# Patient Record
Sex: MALE | Race: WHITE | NOT HISPANIC OR LATINO | Employment: OTHER | URBAN - METROPOLITAN AREA
[De-identification: names, ages, dates, MRNs, and addresses within clinical notes are randomized per-mention and may not be internally consistent; named-entity substitution may affect disease eponyms.]

---

## 2022-11-22 ENCOUNTER — HOSPITAL ENCOUNTER (OUTPATIENT)
Dept: GENERAL RADIOLOGY | Facility: HOSPITAL | Age: 67
Discharge: HOME | End: 2022-11-22
Admitting: RADIOLOGY

## 2022-11-22 DIAGNOSIS — H44.609: ICD-10-CM

## 2022-11-22 PROCEDURE — 70200 X-RAY EXAM OF EYE SOCKETS: CPT

## 2024-08-08 ENCOUNTER — TELEPHONE (OUTPATIENT)
Dept: NEUROLOGY | Facility: CLINIC | Age: 69
End: 2024-08-08

## 2024-08-08 ENCOUNTER — OFFICE VISIT (OUTPATIENT)
Dept: NEUROLOGY | Facility: CLINIC | Age: 69
End: 2024-08-08
Payer: COMMERCIAL

## 2024-08-08 ENCOUNTER — LAB (OUTPATIENT)
Dept: LAB | Facility: HOSPITAL | Age: 69
End: 2024-08-08
Payer: COMMERCIAL

## 2024-08-08 VITALS
SYSTOLIC BLOOD PRESSURE: 148 MMHG | BODY MASS INDEX: 35.68 KG/M2 | HEART RATE: 74 BPM | HEIGHT: 74 IN | WEIGHT: 278 LBS | DIASTOLIC BLOOD PRESSURE: 84 MMHG | OXYGEN SATURATION: 98 %

## 2024-08-08 DIAGNOSIS — G62.9 PERIPHERAL POLYNEUROPATHY: ICD-10-CM

## 2024-08-08 DIAGNOSIS — G62.9 PERIPHERAL POLYNEUROPATHY: Primary | ICD-10-CM

## 2024-08-08 DIAGNOSIS — R25.1 TREMOR: ICD-10-CM

## 2024-08-08 DIAGNOSIS — R41.3 MEMORY LOSS: ICD-10-CM

## 2024-08-08 DIAGNOSIS — R79.9 ABNORMAL FINDING OF BLOOD CHEMISTRY, UNSPECIFIED: ICD-10-CM

## 2024-08-08 DIAGNOSIS — R73.03 PREDIABETES: ICD-10-CM

## 2024-08-08 DIAGNOSIS — R76.8 ELEVATED SERUM IMMUNOGLOBULIN FREE LIGHT CHAINS: ICD-10-CM

## 2024-08-08 PROBLEM — I25.10 CALCIFICATION OF CORONARY ARTERY: Status: ACTIVE | Noted: 2018-10-17

## 2024-08-08 PROBLEM — I25.84 CALCIFICATION OF CORONARY ARTERY: Status: ACTIVE | Noted: 2018-10-17

## 2024-08-08 PROBLEM — G47.33 OBSTRUCTIVE SLEEP APNEA SYNDROME: Status: ACTIVE | Noted: 2021-08-09

## 2024-08-08 PROBLEM — K74.60 CIRRHOSIS: Status: ACTIVE | Noted: 2021-07-27

## 2024-08-08 PROBLEM — I10 HYPERTENSIVE DISORDER: Status: ACTIVE | Noted: 2018-03-09

## 2024-08-08 PROBLEM — E78.5 HYPERLIPIDEMIA: Status: ACTIVE | Noted: 2021-08-09

## 2024-08-08 PROBLEM — M54.16 LUMBAR RADICULOPATHY: Status: ACTIVE | Noted: 2022-11-14

## 2024-08-08 PROBLEM — E11.9 DIABETES MELLITUS: Status: ACTIVE | Noted: 2021-08-09

## 2024-08-08 LAB
FERRITIN SERPL-MCNC: 348.3 NG/ML (ref 30–400)
TSH SERPL DL<=0.05 MIU/L-ACNC: 1.07 UIU/ML (ref 0.27–4.2)
VIT B12 BLD-MCNC: 495 PG/ML (ref 211–946)

## 2024-08-08 PROCEDURE — 82607 VITAMIN B-12: CPT | Performed by: NURSE PRACTITIONER

## 2024-08-08 PROCEDURE — 86235 NUCLEAR ANTIGEN ANTIBODY: CPT | Performed by: NURSE PRACTITIONER

## 2024-08-08 PROCEDURE — 82525 ASSAY OF COPPER: CPT | Performed by: NURSE PRACTITIONER

## 2024-08-08 PROCEDURE — 83655 ASSAY OF LEAD: CPT | Performed by: NURSE PRACTITIONER

## 2024-08-08 PROCEDURE — 83521 IG LIGHT CHAINS FREE EACH: CPT | Performed by: NURSE PRACTITIONER

## 2024-08-08 PROCEDURE — 86258 DGP ANTIBODY EACH IG CLASS: CPT | Performed by: NURSE PRACTITIONER

## 2024-08-08 PROCEDURE — 84425 ASSAY OF VITAMIN B-1: CPT | Performed by: NURSE PRACTITIONER

## 2024-08-08 PROCEDURE — 84165 PROTEIN E-PHORESIS SERUM: CPT | Performed by: NURSE PRACTITIONER

## 2024-08-08 PROCEDURE — 84446 ASSAY OF VITAMIN E: CPT | Performed by: NURSE PRACTITIONER

## 2024-08-08 PROCEDURE — 36415 COLL VENOUS BLD VENIPUNCTURE: CPT | Performed by: NURSE PRACTITIONER

## 2024-08-08 PROCEDURE — 82175 ASSAY OF ARSENIC: CPT | Performed by: NURSE PRACTITIONER

## 2024-08-08 PROCEDURE — 84443 ASSAY THYROID STIM HORMONE: CPT | Performed by: NURSE PRACTITIONER

## 2024-08-08 PROCEDURE — 84155 ASSAY OF PROTEIN SERUM: CPT | Performed by: NURSE PRACTITIONER

## 2024-08-08 PROCEDURE — 84207 ASSAY OF VITAMIN B-6: CPT | Performed by: NURSE PRACTITIONER

## 2024-08-08 PROCEDURE — 86364 TISS TRNSGLTMNASE EA IG CLAS: CPT | Performed by: NURSE PRACTITIONER

## 2024-08-08 PROCEDURE — 86038 ANTINUCLEAR ANTIBODIES: CPT

## 2024-08-08 PROCEDURE — 82728 ASSAY OF FERRITIN: CPT | Performed by: NURSE PRACTITIONER

## 2024-08-08 PROCEDURE — 83825 ASSAY OF MERCURY: CPT | Performed by: NURSE PRACTITIONER

## 2024-08-08 PROCEDURE — 82784 ASSAY IGA/IGD/IGG/IGM EACH: CPT | Performed by: NURSE PRACTITIONER

## 2024-08-08 RX ORDER — ASPIRIN 81 MG/1
81 TABLET ORAL DAILY
COMMUNITY

## 2024-08-08 RX ORDER — LISINOPRIL 40 MG/1
1 TABLET ORAL DAILY
COMMUNITY
Start: 2024-06-27

## 2024-08-08 NOTE — TELEPHONE ENCOUNTER
----- Message from Flor Gamboa sent at 8/8/2024  3:13 PM EDT -----  Low B12 level. Recommend once weekly B12 injections x 4 weeks followed by biweekly injections x 4 months with repeat lab draw 1 month from last injection. Can we see if they want to do these at home or in office?

## 2024-08-08 NOTE — TELEPHONE ENCOUNTER
Called spoke with wife- she is going to talk to patient and call us back on how they would like to proceed
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
## 2024-08-09 LAB
ALBUMIN SERPL ELPH-MCNC: 3.3 G/DL (ref 2.9–4.4)
ALBUMIN/GLOB SERPL: 0.9 {RATIO} (ref 0.7–1.7)
ALPHA1 GLOB SERPL ELPH-MCNC: 0.3 G/DL (ref 0–0.4)
ALPHA2 GLOB SERPL ELPH-MCNC: 0.8 G/DL (ref 0.4–1)
B-GLOBULIN SERPL ELPH-MCNC: 1.2 G/DL (ref 0.7–1.3)
ENA SS-A AB SER-ACNC: <0.2 AI (ref 0–0.9)
ENA SS-B AB SER-ACNC: <0.2 AI (ref 0–0.9)
GAMMA GLOB SERPL ELPH-MCNC: 1.4 G/DL (ref 0.4–1.8)
GLIADIN PEPTIDE IGA SER-ACNC: 9 UNITS (ref 0–19)
GLOBULIN SER CALC-MCNC: 3.7 G/DL (ref 2.2–3.9)
IGA SERPL-MCNC: 625 MG/DL (ref 61–437)
KAPPA LC FREE SER-MCNC: 31.7 MG/L (ref 3.3–19.4)
KAPPA LC FREE/LAMBDA FREE SER: 1.18 {RATIO} (ref 0.26–1.65)
LABORATORY COMMENT REPORT: NORMAL
LAMBDA LC FREE SERPL-MCNC: 26.9 MG/L (ref 5.7–26.3)
M PROTEIN SERPL ELPH-MCNC: NORMAL G/DL
PROT PATTERN SERPL ELPH-IMP: NORMAL
PROT SERPL-MCNC: 7 G/DL (ref 6–8.5)
TTG IGA SER-ACNC: <2 U/ML (ref 0–3)

## 2024-08-10 LAB — COPPER SERPL-MCNC: 96 UG/DL (ref 69–132)

## 2024-08-11 LAB
ANA SER QL IF: NEGATIVE
LABORATORY COMMENT REPORT: NORMAL
PYRIDOXAL PHOS SERPL-MCNC: 14.1 UG/L (ref 3.4–65.2)

## 2024-08-12 LAB — VIT B1 BLD-SCNC: 120.4 NMOL/L (ref 66.5–200)

## 2024-08-12 NOTE — PROGRESS NOTES
Placed referral for hematology to follow up on abnormal light chains. Attempted to contact patient via telephone on 8/12/2024 at 12:48 PM with no answer and no voicemail.

## 2024-08-13 LAB
A-TOCOPHEROL VIT E SERPL-MCNC: 7.1 MG/L (ref 9–29)
ARSENIC BLD-MCNC: 5 UG/L (ref 0–9)
GAMMA TOCOPHEROL SERPL-MCNC: 4.4 MG/L (ref 0.5–4.9)
LEAD BLDV-MCNC: 3 UG/DL (ref 0–3.4)
MERCURY BLD-MCNC: <1 UG/L (ref 0–14.9)

## 2024-08-26 ENCOUNTER — TELEPHONE (OUTPATIENT)
Dept: NEUROLOGY | Facility: CLINIC | Age: 69
End: 2024-08-26
Payer: MEDICARE

## 2024-08-26 NOTE — TELEPHONE ENCOUNTER
Message from hematology, Dr Bond reviewed- Please repeat serum immunofixation and if that is unremarkable, he likely does not need to be seen by our practice.  However, if it shows a monoclonal protein on the serum immunofixation, perhaps we could try to get him in within a couple of months.

## 2024-08-28 DIAGNOSIS — G62.9 PERIPHERAL POLYNEUROPATHY: Primary | ICD-10-CM

## 2024-08-28 NOTE — TELEPHONE ENCOUNTER
Can we call patient and let him know that we need him to come for repeat lab draw for the blood markers that were abnormal. Hematology would like a recheck before deciding to see him

## 2024-09-16 ENCOUNTER — HOSPITAL ENCOUNTER (OUTPATIENT)
Dept: MRI IMAGING | Facility: HOSPITAL | Age: 69
Discharge: HOME | End: 2024-09-16
Admitting: NURSE PRACTITIONER
Payer: MEDICARE

## 2024-09-16 DIAGNOSIS — R41.3 MEMORY LOSS: ICD-10-CM

## 2024-09-16 DIAGNOSIS — R25.1 TREMOR: ICD-10-CM

## 2024-09-16 PROCEDURE — 0 GADOBENATE DIMEGLUMINE 529 MG/ML SOLUTION: Performed by: NURSE PRACTITIONER

## 2024-09-16 PROCEDURE — 70553 MRI BRAIN STEM W/O & W/DYE: CPT

## 2024-09-16 PROCEDURE — A9577 INJ MULTIHANCE: HCPCS | Performed by: NURSE PRACTITIONER

## 2024-09-16 RX ADMIN — GADOBENATE DIMEGLUMINE 20 ML: 529 INJECTION, SOLUTION INTRAVENOUS at 12:12

## 2024-09-20 ENCOUNTER — OFFICE VISIT (OUTPATIENT)
Dept: NEUROLOGY | Facility: CLINIC | Age: 69
End: 2024-09-20
Payer: MEDICARE

## 2024-09-20 VITALS
HEART RATE: 97 BPM | SYSTOLIC BLOOD PRESSURE: 148 MMHG | HEIGHT: 74 IN | WEIGHT: 277 LBS | BODY MASS INDEX: 35.55 KG/M2 | DIASTOLIC BLOOD PRESSURE: 88 MMHG

## 2024-09-20 DIAGNOSIS — G62.9 PERIPHERAL POLYNEUROPATHY: Primary | ICD-10-CM

## 2024-09-20 DIAGNOSIS — R25.1 TREMOR: ICD-10-CM

## 2024-09-20 DIAGNOSIS — R41.3 MEMORY LOSS: ICD-10-CM

## 2024-09-20 RX ORDER — ATORVASTATIN CALCIUM 20 MG/1
1 TABLET, FILM COATED ORAL DAILY
COMMUNITY
Start: 2024-09-15

## 2024-09-30 ENCOUNTER — OFFICE VISIT (OUTPATIENT)
Dept: NEUROLOGY | Facility: CLINIC | Age: 69
End: 2024-09-30
Payer: MEDICARE

## 2024-09-30 ENCOUNTER — LAB (OUTPATIENT)
Dept: LAB | Facility: HOSPITAL | Age: 69
End: 2024-09-30
Payer: MEDICARE

## 2024-09-30 VITALS
DIASTOLIC BLOOD PRESSURE: 68 MMHG | SYSTOLIC BLOOD PRESSURE: 142 MMHG | WEIGHT: 280 LBS | HEART RATE: 93 BPM | BODY MASS INDEX: 35.94 KG/M2 | OXYGEN SATURATION: 98 % | HEIGHT: 74 IN

## 2024-09-30 DIAGNOSIS — G62.9 PERIPHERAL POLYNEUROPATHY: ICD-10-CM

## 2024-09-30 DIAGNOSIS — R76.8 ELEVATED SERUM IMMUNOGLOBULIN FREE LIGHT CHAINS: ICD-10-CM

## 2024-09-30 DIAGNOSIS — R26.9 GAIT DISTURBANCE: ICD-10-CM

## 2024-09-30 DIAGNOSIS — R25.1 TREMOR: ICD-10-CM

## 2024-09-30 DIAGNOSIS — R79.9 ABNORMAL FINDING OF BLOOD CHEMISTRY, UNSPECIFIED: ICD-10-CM

## 2024-09-30 DIAGNOSIS — R41.3 MEMORY LOSS: Primary | ICD-10-CM

## 2024-09-30 PROCEDURE — 3078F DIAST BP <80 MM HG: CPT | Performed by: NURSE PRACTITIONER

## 2024-09-30 PROCEDURE — 82784 ASSAY IGA/IGD/IGG/IGM EACH: CPT | Performed by: NURSE PRACTITIONER

## 2024-09-30 PROCEDURE — 83521 IG LIGHT CHAINS FREE EACH: CPT | Performed by: NURSE PRACTITIONER

## 2024-09-30 PROCEDURE — 3077F SYST BP >= 140 MM HG: CPT | Performed by: NURSE PRACTITIONER

## 2024-09-30 PROCEDURE — 99214 OFFICE O/P EST MOD 30 MIN: CPT | Performed by: NURSE PRACTITIONER

## 2024-09-30 PROCEDURE — 86334 IMMUNOFIX E-PHORESIS SERUM: CPT | Performed by: NURSE PRACTITIONER

## 2024-09-30 RX ORDER — CARBIDOPA/LEVODOPA 25MG-250MG
1 TABLET ORAL DAILY
Qty: 30 TABLET | Refills: 2 | Status: SHIPPED | OUTPATIENT
Start: 2024-09-30 | End: 2025-09-30

## 2024-09-30 NOTE — PROGRESS NOTES
[facility] NEUROLOGY         Date of Visit: 2024    Name: Zion Swann    :  1955    PCP: Broderick Acosta MD    Visit Type: an initial evaluation         Subjective     Patient ID: Zion is a 69 y.o. male.         History of Present Illness  I have had the pleasure of seeing your patient today. As you may know he is a 69 year old male here today for follow-up for tremor, memory loss, and gait changes. He was referred by his PCP. He is accompanied by his wife for appointment today.     History:    Patient does have history of RONNI (not currently on CPAP, waiting on new machine), hypertension, hyperlipidemia, possible prediabeties, lumbar radiculopathy, cirrohsis of the liver, previous artery bypass for femoral aneurysm.     Patient does have family history of a parent and sibling with parkinson's diease. He has never had any workup in past by neurology. He does report one concussion in the past.     See office visit from 2024 for more detailed historical information on presentation of symptoms.    Patient did have MRI of the brain done in July which revealed evidence for lacunar infarcts related to hypertension as well as a small right thalamic microhemorrhage.  He is taking vitamin D D as well as vitamin B12.  He also had some abnormalities noted of his light chains which we are repeating today.  We also had him start his carbidopa taking it 3 times daily as he is not taking this consistently.  He is here today for evaluation.    Current:    Patient states that he is doing fairly well on the carbidopa.  He has not noticed any side effect issues.  They have noticed a decrease in his tremor symptoms with a mild improvement in overall fluidity of movement since last visit.  They state he still has some tremor and a lot of stiffness in decreased mobility issues.  They are hoping to get a wheelchair as long distance ambulation is extremely difficult for him.  He does fine with short  distance day-to-day ambulation but having to walk more than 100 feet or so becomes very difficult due to the neuropathy and back issues.  This limits him drastically and his ability to do things outside of the home.  They deny any stomach issues or constipation with the medication.  No hallucinations or vivid dreams.  No lightheadedness or dizziness.  He also continues to have issues with short-term memory.  No other new neurological complaints at today's visit.              The following portions of the patient's history were reviewed and updated as appropriate: allergies, current medications, past family history, past medical history, past social history, past surgical history, and problem list.                 Review of Systems   Constitutional:  Negative for activity change, appetite change and unexpected weight change.   HENT:  Negative for hearing loss, tinnitus and trouble swallowing.    Eyes:  Negative for photophobia, pain and visual disturbance.   Respiratory:  Negative for chest tightness and shortness of breath.    Cardiovascular:  Negative for palpitations.   Musculoskeletal:  Positive for back pain and gait problem.   Neurological:  Positive for dizziness and tremors. Negative for syncope, facial asymmetry, speech difficulty and light-headedness.   Psychiatric/Behavioral:  Positive for sleep disturbance. Negative for behavioral problems, confusion, dysphoric mood and hallucinations. The patient is not hyperactive.             Current Medications:    Current Outpatient Medications   Medication Instructions    aspirin 81 mg, Oral, Daily    atorvastatin (LIPITOR) 20 MG tablet 1 tablet, Oral, Daily    carbidopa-levodopa (Sinemet)  MG per tablet 1 tablet, Oral, Daily    lisinopril (PRINIVIL,ZESTRIL) 40 MG tablet 1 tablet, Oral, Daily    metFORMIN (GLUCOPHAGE) 500 MG tablet TAKE 1 TABLET BY MOUTH TWICE A DAY AFTER MEALS    vitamin E 400 Units, Oral, Daily          /68   Pulse 93   Ht 188 cm  "(74.02\")   Wt 127 kg (280 lb)   SpO2 98%   BMI 35.93 kg/m²                Objective     Neurological Exam  Mental Status  Awake, alert and oriented to person, place and time. Speech is normal. Language is fluent with no aphasia.    Cranial Nerves  CN II: Visual fields full to confrontation.  CN III, IV, VI: Extraocular movements intact bilaterally. Normal lids and orbits bilaterally. Pupils equal round and reactive to light bilaterally.  CN V: Facial sensation is normal.  CN VII: Full and symmetric facial movement.  CN IX, X: Palate elevates symmetrically  CN XI: Shoulder shrug strength is normal.  CN XII: Tongue midline without atrophy or fasciculations.    Motor  Normal muscle bulk throughout. Increased muscle tone. The following abnormal movements were seen: Moderate bradykinesia slightly improved from previous exam    Minimal pill-rolling resting tremor in the right upper extremity.   Strength is 5/5 throughout all four extremities.    Sensory  Light touch abnormality: Diminished bilateral feet. Pinprick abnormality: Diminished bialteral feet to mid calf. Vibration abnormality: Diminshed bilateral feet to knees. Proprioception abnormality: Diminished bilateral feet .     Reflexes  Deep tendon reflexes are 2+ and symmetric except as noted.                                            Right                      Left  Patellar                                1+                         1+  Achilles                                1+                         1+    Coordination    Finger-to-nose, rapid alternating movements and heel-to-shin normal bilaterally without dysmetria.    Gait  Casual gait: Wide stance. Reduced stride length. Ataxic gait. Normal right arm swing. Reduced left arm swing. Toe walking abnormality: Heel walking abnormality: Tandem gait abnormality: Romberg is present. Unable to rise from chair without using arms.      Physical Exam  Constitutional:       Appearance: Normal appearance. He is normal " weight.   HENT:      Head: Normocephalic.   Eyes:      General: Lids are normal.      Extraocular Movements: Extraocular movements intact.      Pupils: Pupils are equal, round, and reactive to light.   Pulmonary:      Effort: Pulmonary effort is normal.   Musculoskeletal:         General: Normal range of motion.   Skin:     General: Skin is warm.   Neurological:      Motor: Motor strength is normal.     Coordination: Coordination is intact. Romberg sign positive.      Deep Tendon Reflexes:      Reflex Scores:       Patellar reflexes are 1+ on the right side and 1+ on the left side.       Achilles reflexes are 1+ on the right side and 1+ on the left side.  Psychiatric:         Mood and Affect: Mood normal.         Speech: Speech normal.         Behavior: Behavior normal.         Thought Content: Thought content normal.                     Assessment & Plan     Diagnoses and all orders for this visit:    1. Memory loss (Primary)  -     Ambulatory Referral to Neuropsychology    2. Tremor  -     carbidopa-levodopa (Sinemet)  MG per tablet; Take 1 tablet by mouth Daily.  Dispense: 30 tablet; Refill: 2    3. Abnormal finding of blood chemistry, unspecified    4. Elevated serum immunoglobulin free light chains    5. Peripheral polyneuropathy  -     Miscellaneous DME    6. Gait disturbance  -     Miscellaneous DME           At this time would like to increase his carbidopa levodopa to 25/250 taking 1 tablet mouth 3 times daily.  He is going to use 2 of his current dosage 3 times a day to start with and I will send in prescription for the higher strength which he can start when he gets lower on how much he has at home.    He is going to go downstairs for lab draw today for the elevated immunoglobulins.    I did also go ahead and place referral for neuropsychological testing.    He will also undergo evaluation for possible wheelchair or scooter for the long distance ambulation.  We will fax this to rehab  medical.    Follow-up in 3 months or sooner if needed.            Flor STOUT    Neurology    [facility] [place]    Phone: [phone]    9/30/2024 , 13:08 EDT

## 2024-10-01 LAB
KAPPA LC FREE SER-MCNC: 43.3 MG/L (ref 3.3–19.4)
KAPPA LC FREE/LAMBDA FREE SER: 1.14 {RATIO} (ref 0.26–1.65)
LAMBDA LC FREE SERPL-MCNC: 38.1 MG/L (ref 5.7–26.3)

## 2024-10-02 DIAGNOSIS — R76.8 ELEVATED SERUM IMMUNOGLOBULIN FREE LIGHT CHAINS: Primary | ICD-10-CM

## 2024-10-02 LAB
IGA SERPL-MCNC: 663 MG/DL (ref 61–437)
IGG SERPL-MCNC: 1293 MG/DL (ref 603–1613)
IGM SERPL-MCNC: 259 MG/DL (ref 20–172)
PROT PATTERN SERPL IFE-IMP: ABNORMAL

## 2024-11-02 DIAGNOSIS — R25.1 TREMOR: ICD-10-CM

## 2024-11-04 RX ORDER — CARBIDOPA/LEVODOPA 10MG-100MG
1 TABLET ORAL 3 TIMES DAILY
Qty: 270 TABLET | Refills: 1 | Status: SHIPPED | OUTPATIENT
Start: 2024-11-04

## 2024-12-09 ENCOUNTER — TELEPHONE (OUTPATIENT)
Dept: NEUROLOGY | Facility: CLINIC | Age: 69
End: 2024-12-09
Payer: MEDICARE

## 2024-12-09 NOTE — TELEPHONE ENCOUNTER
Left message for the patient to return our call. Patient is scheduled to see Christy on 12/16/2024. Patient needs Neuropysch testing completed at [facility] prior. Need to reschedule patient for after testing.

## 2024-12-10 ENCOUNTER — TELEPHONE (OUTPATIENT)
Dept: NEUROLOGY | Facility: CLINIC | Age: 69
End: 2024-12-10
Payer: MEDICARE

## 2024-12-10 NOTE — TELEPHONE ENCOUNTER
Caller: Zion Swann    Relationship: Self    Best call back number:   Telephone Information:   Mobile [phone]     What is the best time to reach you:     Who are you requesting to speak with (clinical staff, provider,  specific staff member):   MARAL    Do you know the name of the person who called:     What was the call regarding:   PT CALLING BACK TO LET MARAL KNOW PT IS SCHEDULED FOR A NEURO PSYCH EVALUATION ON 4-1-25 AT 9:30 AM AT [id number] [place] [address]. PT IS ON THE WAIT LIST FOR A SOONER APPT.

## 2024-12-10 NOTE — TELEPHONE ENCOUNTER
Patient returned our call, he is going to call Daniel and get scheduled. His appointment for 12/16/2024 with Christy has been cancelled. He will call back to reschedule.

## 2025-01-02 DIAGNOSIS — R25.1 TREMOR: ICD-10-CM

## 2025-01-02 RX ORDER — CARBIDOPA/LEVODOPA 25MG-250MG
1 TABLET ORAL DAILY
Qty: 90 TABLET | Refills: 0 | Status: SHIPPED | OUTPATIENT
Start: 2025-01-02